# Patient Record
Sex: FEMALE | URBAN - METROPOLITAN AREA
[De-identification: names, ages, dates, MRNs, and addresses within clinical notes are randomized per-mention and may not be internally consistent; named-entity substitution may affect disease eponyms.]

---

## 2024-04-26 ENCOUNTER — OV CON (OUTPATIENT)
Dept: URBAN - METROPOLITAN AREA CLINIC 115 | Facility: CLINIC | Age: 49
End: 2024-04-26
Payer: COMMERCIAL

## 2024-04-26 VITALS
SYSTOLIC BLOOD PRESSURE: 109 MMHG | DIASTOLIC BLOOD PRESSURE: 73 MMHG | TEMPERATURE: 97.7 F | HEART RATE: 94 BPM | BODY MASS INDEX: 24.67 KG/M2 | HEIGHT: 68 IN | WEIGHT: 162.8 LBS

## 2024-04-26 DIAGNOSIS — R51.9 FREQUENT HEADACHES: ICD-10-CM

## 2024-04-26 DIAGNOSIS — Z79.1 NSAID LONG-TERM USE: ICD-10-CM

## 2024-04-26 DIAGNOSIS — K62.5 RECTAL BLEEDING: ICD-10-CM

## 2024-04-26 DIAGNOSIS — R63.4 LOSING WEIGHT: ICD-10-CM

## 2024-04-26 DIAGNOSIS — K59.09 CHRONIC CONSTIPATION: ICD-10-CM

## 2024-04-26 PROBLEM — 305058001: Status: ACTIVE | Noted: 2024-04-26

## 2024-04-26 PROBLEM — 236069009: Status: ACTIVE | Noted: 2024-04-26

## 2024-04-26 PROBLEM — 712831003: Status: ACTIVE | Noted: 2024-04-26

## 2024-04-26 PROBLEM — 12063002: Status: ACTIVE | Noted: 2024-04-26

## 2024-04-26 PROCEDURE — 99204 OFFICE O/P NEW MOD 45 MIN: CPT

## 2024-04-26 NOTE — HPI-TODAY'S VISIT:
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  She states she thinks it's due to her hemorrhoids; states she gets flares once a year when she gets rectal pain.  BMs are q3days, has phases of constipation and diarrhea. Reports abdominal pain sometimes (RLQ or LLQ); doesn't know triggers/relieves. Denies n/v, dysphagia, odynophagia, heartburn. Takes Goodys powder often for her headaches. Last colonoscopy: never

## 2024-04-26 NOTE — PHYSICAL EXAM GASTROINTESTINAL
Abdomen , soft, mild suprapubic tenderness, nondistended , no guarding or rigidity , no masses palpable

## 2024-06-07 ENCOUNTER — CLAIMS CREATED FROM THE CLAIM WINDOW (OUTPATIENT)
Dept: URBAN - METROPOLITAN AREA SURGERY CENTER 13 | Facility: SURGERY CENTER | Age: 49
End: 2024-06-07
Payer: COMMERCIAL

## 2024-06-07 DIAGNOSIS — K62.5 RECTAL BLEEDING: ICD-10-CM

## 2024-06-07 DIAGNOSIS — K64.8 OTHER HEMORRHOIDS: ICD-10-CM

## 2024-06-07 PROCEDURE — 45378 DIAGNOSTIC COLONOSCOPY: CPT | Performed by: INTERNAL MEDICINE

## 2024-06-07 PROCEDURE — 00811 ANES LWR INTST NDSC NOS: CPT | Performed by: ANESTHESIOLOGIST ASSISTANT

## 2024-06-07 PROCEDURE — 00811 ANES LWR INTST NDSC NOS: CPT | Performed by: ANESTHESIOLOGY

## 2024-07-02 ENCOUNTER — OFFICE VISIT (OUTPATIENT)
Dept: URBAN - METROPOLITAN AREA CLINIC 115 | Facility: CLINIC | Age: 49
End: 2024-07-02
Payer: COMMERCIAL

## 2024-07-02 ENCOUNTER — DASHBOARD ENCOUNTERS (OUTPATIENT)
Age: 49
End: 2024-07-02

## 2024-07-02 VITALS
SYSTOLIC BLOOD PRESSURE: 104 MMHG | TEMPERATURE: 98.3 F | HEART RATE: 111 BPM | DIASTOLIC BLOOD PRESSURE: 73 MMHG | WEIGHT: 162 LBS | BODY MASS INDEX: 24.55 KG/M2 | HEIGHT: 68 IN

## 2024-07-02 DIAGNOSIS — K62.5 RECTAL BLEEDING: ICD-10-CM

## 2024-07-02 DIAGNOSIS — K64.4 EXTERNAL HEMORRHOIDS: ICD-10-CM

## 2024-07-02 DIAGNOSIS — K59.09 CHRONIC CONSTIPATION: ICD-10-CM

## 2024-07-02 DIAGNOSIS — K64.8 INTERNAL HEMORRHOIDS: ICD-10-CM

## 2024-07-02 PROBLEM — 90458007: Status: ACTIVE | Noted: 2024-07-02

## 2024-07-02 PROBLEM — 23913003: Status: ACTIVE | Noted: 2024-07-02

## 2024-07-02 PROCEDURE — 99213 OFFICE O/P EST LOW 20 MIN: CPT

## 2024-07-02 NOTE — HPI-TODAY'S VISIT:
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  4/26/24: She states she thinks it's due to her hemorrhoids; states she gets flares once a year when she gets rectal pain.  BMs are q3days, has phases of constipation and diarrhea. Reports abdominal pain sometimes (RLQ or LLQ); doesn't know triggers/relieves. Denies n/v, dysphagia, odynophagia, heartburn. Takes Goodys powder often for her headaches. Last colonoscopy: never 7/2/24: COL with Dr. Berg showed ext/int hemorrhoids only; repeat in 10Y. Has seen neurology for her headaches and was given nurtec qod and rizatriptan prn as an alternative to goody's powder. She states she has not been taking the Goody's but not noting any improvement for her headaches. MRI brain ordered to assess her AVM. Was advised to take miralax 1 cap a day, but she was on vacation so she did not take it. BMs are still q2-3days, bleeding with it every time, straining sometimes. Denies rectal pain or itching.